# Patient Record
Sex: FEMALE
[De-identification: names, ages, dates, MRNs, and addresses within clinical notes are randomized per-mention and may not be internally consistent; named-entity substitution may affect disease eponyms.]

---

## 2020-07-17 ENCOUNTER — HOSPITAL ENCOUNTER (EMERGENCY)
Dept: HOSPITAL 56 - MW.ED | Age: 20
Discharge: HOME | End: 2020-07-17
Payer: COMMERCIAL

## 2020-07-17 DIAGNOSIS — Z79.899: ICD-10-CM

## 2020-07-17 DIAGNOSIS — Z88.8: ICD-10-CM

## 2020-07-17 DIAGNOSIS — R18.8: Primary | ICD-10-CM

## 2020-07-17 DIAGNOSIS — Z85.43: ICD-10-CM

## 2020-07-17 DIAGNOSIS — K76.9: ICD-10-CM

## 2020-07-17 DIAGNOSIS — J90: ICD-10-CM

## 2020-07-17 LAB
BUN SERPL-MCNC: 9 MG/DL (ref 7–18)
CHLORIDE SERPL-SCNC: 103 MMOL/L (ref 98–107)
CO2 SERPL-SCNC: 26.2 MMOL/L (ref 21–32)
GLUCOSE SERPL-MCNC: 92 MG/DL (ref 74–106)
POTASSIUM SERPL-SCNC: 3.6 MMOL/L (ref 3.5–5.1)
SODIUM SERPL-SCNC: 143 MMOL/L (ref 136–145)

## 2020-07-17 PROCEDURE — 81003 URINALYSIS AUTO W/O SCOPE: CPT

## 2020-07-17 PROCEDURE — 36415 COLL VENOUS BLD VENIPUNCTURE: CPT

## 2020-07-17 PROCEDURE — 80053 COMPREHEN METABOLIC PANEL: CPT

## 2020-07-17 PROCEDURE — 96361 HYDRATE IV INFUSION ADD-ON: CPT

## 2020-07-17 PROCEDURE — 96374 THER/PROPH/DIAG INJ IV PUSH: CPT

## 2020-07-17 PROCEDURE — 74177 CT ABD & PELVIS W/CONTRAST: CPT

## 2020-07-17 PROCEDURE — 83605 ASSAY OF LACTIC ACID: CPT

## 2020-07-17 PROCEDURE — 85025 COMPLETE CBC W/AUTO DIFF WBC: CPT

## 2020-07-17 PROCEDURE — 99284 EMERGENCY DEPT VISIT MOD MDM: CPT

## 2020-07-17 PROCEDURE — 85610 PROTHROMBIN TIME: CPT

## 2020-07-17 PROCEDURE — 87040 BLOOD CULTURE FOR BACTERIA: CPT

## 2020-07-17 NOTE — EDM.PDOC
ED [facility] GENERAL MEDICAL PROBLEM





- General


Chief Complaint: General


Stated Complaint: BLURRED VISION; DIZZINESS


Time Seen by Provider: 07/17/20 12:04





- History of Present Illness


INITIAL COMMENTS - FREE TEXT/NARRATIVE: 





HISTORY AND PHYSICAL:





History of present illness:


This 20-year-old female was recently diagnosed with ovarian cancer and had a 

large resection that was done on July 8 and the patient was discharged on 

Tuesday.  Today she began having some intermittent lightheadedness while 

standing up, visual changes, but had been wearing a scopolamine patch.  She also

has increased tenderness in her abdomen.  No nausea or vomiting.  No reported 

fevers.  No urinary symptoms.  Worse with walking.  Better with laying down.





Review of systems: 


A 10-point review of systems, other than pertinent positives and negatives as 

stated per HPI, is otherwise negative.





Past medical history: 


As per history of present illness and as reviewed below otherwise 

noncontributory.





Surgical history: 


As per history of present illness and as reviewed below otherwise 

noncontributory.





Social history: 


No reported history of drug or alcohol abuse.





Family history: 


As per history of present illness and as reviewed below otherwise noncon

tributory.





Physical exam:


VITAL SIGNS:  Reviewed.


GENERAL: Mild distress


HEAD: No signs of head trauma.


EYES: Pupils are equal.  Extraocular motions intact.


EARS: Hearing grossly intact.


MOUTH: Oropharynx is normal.


NECK: No adenopathy, no JVD.   


CHEST: Chest with clear breath sounds bilaterally.  No wheezes, rales, or 

rhonchi.


CARDIAC: Mild tachycardia.  Regular rhythm.  No discernible murmur.


VASCULAR: Peripheral pulses normal and equal in all extremities.


ABDOMEN: Soft, mild diffuse tenderness, surgical incision is clean dry and 

intact.


MUSCULOSKELETAL: Good range of motion of all major joints. Extremities without 

clubbing, cyanosis or edema.


NEUROLOGIC EXAM: Alert and oriented x 3.  No focal sensory or motor deficits.  

Speech normal.  Follows commands.


PSYCHIATRIC: Mood normal.


SKIN: No rash or lesions.








Initial Differential Diagnosis & Plan:


Intra-abdominal abscess, succus leakage, hematoma, seroma, bacteremia





Labs to include CBC, complete metabolic panel, lactate, blood cultures, treat 

her mild nausea with Ativan.  She is allergic to phenothiazines and reports that

Zofran usually does not work.








Definitive disposition and diagnosis as appropriate pending reevaluation and 

review of above.











- Related Data


                                    Allergies











Allergy/AdvReac Type Severity Reaction Status Date / Time


 


prochlorperazine Allergy  Other Verified 07/17/20 12:20





[From Compazine]     











Home Meds: 


                                    Home Meds





Enoxaparin [Lovenox] 40 mg SUBCUT DAILY 07/17/20 [History]


HYDROmorphone [Dilaudid] 2 mg PO ASDIRECTED 07/17/20 [History]











ED ROS GENERAL





- Review of Systems


Review Of Systems: See Below (Noted)





ED EXAM, GENERAL





- Physical Exam


Exam: See Below (Noted as above)





Course





- Vital Signs


Text/Narrative:: 





[facility], and she 

was reassured by the exam.  The patient has good follow-up and she does not feel

 that the patient needs to go for immediate follow-up to [place].  I feel that 

this is reasonable.  The CT does not show evidence of intra-abdominal abscess.  

The patient does have ascites which would be expected after this radical type 

surgery.  There is no other underlying pathology that was unexpected.  The 

surgery team will review this closely.  We will provide a CD of the CT scan for 

their review.  I will also fax the records to the surgical office including my 

note.





My diagnostic impression


1.  Dizziness likely secondary to scopolamine patch


2.  Ascites postoperative likely secondary to normal inflammatory reaction


3.  New liver lesion unclear if this represents infection versus malignancy.  

Given that the patient has no evidence of infectious findings including a normal

 white count, lack of fever, and no significant tachycardia I do not feel that 

this represents infection that requires antibiotics at this point.


4.  History of ovarian cancer with radical resection in the abdomen.





Discharge home follow-up with surgical team and oncology.


Last Recorded V/S: 


                                Last Vital Signs











Temp  97.8 F   07/17/20 12:17


 


Pulse  82   07/17/20 12:17


 


Resp  18   07/17/20 12:17


 


BP  111/78   07/17/20 12:17


 


Pulse Ox  97   07/17/20 12:17














- Orders/Labs/Meds


Orders: 


                               Active Orders 24 hr











 Category Date Time Status


 


 Cardiac Monitoring [RC] .AS DIRECTED Care  07/17/20 12:17 Active


 


 Pulse Oximetry [RC] ASDIRECTED Care  07/17/20 12:17 Active


 


 CULTURE BLOOD [BC] Stat Lab  07/17/20 12:49 Received


 


 CULTURE BLOOD [BC] Stat Lab  07/17/20 12:52 Received


 


 Sodium Chloride 0.9% [Saline Flush] Med  07/17/20 12:16 Active





 10 ml FLUSH ASDIRECTED PRN   


 


 Sodium Chloride 0.9% [Saline Flush] Med  07/17/20 12:16 Active





 2.5 ml FLUSH ASDIRECTED PRN   


 


 Blood Culture x2 Reflex Set [OM.PC] Stat Ot  07/17/20 12:17 Ordered


 


 Saline Lock Insert [OM.PC] Stat Ot  07/17/20 12:16 Ordered








                                Medication Orders





Sodium Chloride (Saline Flush)  10 ml FLUSH ASDIRECTED PRN


   PRN Reason: Keep Vein Open


   Last Admin: 07/17/20 12:35  Dose: 10 ml


   Documented by: JARRET


Sodium Chloride (Saline Flush)  2.5 ml FLUSH ASDIRECTED PRN


   PRN Reason: Keep Vein Open


   Last Admin: 07/17/20 12:35  Dose: 2.5 ml


   Documented by: JARRET








Labs: 


                                Laboratory Tests











  07/17/20 07/17/20 07/17/20 Range/Units





  12:18 12:36 12:36 


 


WBC    8.66  (4.0-11.0)  K/uL


 


RBC    4.06 L  (4.30-5.90)  M/uL


 


Hgb    12.3  (12.0-16.0)  g/dL


 


Hct    36.4  (36.0-46.0)  %


 


MCV    89.7  (80.0-98.0)  fL


 


MCH    30.3  (27.0-32.0)  pg


 


MCHC    33.8  (31.0-37.0)  g/dL


 


RDW Std Deviation    43.4  (28.0-62.0)  fl


 


RDW Coeff of Gio    14  (11.0-15.0)  %


 


Plt Count    375  (150-400)  K/uL


 


MPV    9.20  (7.40-12.00)  fL


 


Neut % (Auto)    66.3  (48.0-80.0)  %


 


Lymph % (Auto)    17.4  (16.0-40.0)  %


 


Mono % (Auto)    8.2  (0.0-15.0)  %


 


Eos % (Auto)    7.6 H  (0.0-7.0)  %


 


Baso % (Auto)    0.5  (0.0-1.5)  %


 


Neut # (Auto)    5.7  (1.4-5.7)  K/uL


 


Lymph # (Auto)    1.5  (0.6-2.4)  K/uL


 


Mono # (Auto)    0.7  (0.0-0.8)  K/uL


 


Eos # (Auto)    0.7  (0.0-0.7)  K/uL


 


Baso # (Auto)    0.0  (0.0-0.1)  K/uL


 


Nucleated RBC %    0.0  /100WBC


 


Nucleated RBCs #    0  K/uL


 


INR     


 


Lactate   0.9   (0.20-2.00)  mmol/L


 


Sodium     (136-145)  mmol/L


 


Potassium     (3.5-5.1)  mmol/L


 


Chloride     ()  mmol/L


 


Carbon Dioxide     (21.0-32.0)  mmol/L


 


BUN     (7.0-18.0)  mg/dL


 


Creatinine     (0.6-1.0)  mg/dL


 


Est Cr Clr Drug Dosing     mL/min


 


Estimated GFR (MDRD)     ml/min


 


Glucose     ()  mg/dL


 


Calcium     (8.5-10.1)  mg/dL


 


Total Bilirubin     (0.2-1.0)  mg/dL


 


AST     (15-37)  IU/L


 


ALT     (14-63)  IU/L


 


Alkaline Phosphatase     ()  U/L


 


Total Protein     (6.4-8.2)  g/dL


 


Albumin     (3.4-5.0)  g/dL


 


Globulin     (2.6-4.0)  g/dL


 


Albumin/Globulin Ratio     (0.9-1.6)  


 


Urine Color  YELLOW    


 


Urine Appearance  CLEAR    


 


Urine pH  6.0    (5.0-8.0)  


 


Ur Specific Gravity  1.020    (1.001-1.035)  


 


Urine Protein  NEGATIVE    (NEGATIVE)  mg/dL


 


Urine Glucose (UA)  NEGATIVE    (NEGATIVE)  mg/dL


 


Urine Ketones  NEGATIVE    (NEGATIVE)  mg/dL


 


Urine Occult Blood  NEGATIVE    (NEGATIVE)  


 


Urine Nitrite  NEGATIVE    (NEGATIVE)  


 


Urine Bilirubin  NEGATIVE    (NEGATIVE)  


 


Urine Urobilinogen  0.2    (<2.0)  EU/dL


 


Ur Leukocyte Esterase  NEGATIVE    (NEGATIVE)  














  07/17/20 07/17/20 Range/Units





  12:36 12:36 


 


WBC    (4.0-11.0)  K/uL


 


RBC    (4.30-5.90)  M/uL


 


Hgb    (12.0-16.0)  g/dL


 


Hct    (36.0-46.0)  %


 


MCV    (80.0-98.0)  fL


 


MCH    (27.0-32.0)  pg


 


MCHC    (31.0-37.0)  g/dL


 


RDW Std Deviation    (28.0-62.0)  fl


 


RDW Coeff of Gio    (11.0-15.0)  %


 


Plt Count    (150-400)  K/uL


 


MPV    (7.40-12.00)  fL


 


Neut % (Auto)    (48.0-80.0)  %


 


Lymph % (Auto)    (16.0-40.0)  %


 


Mono % (Auto)    (0.0-15.0)  %


 


Eos % (Auto)    (0.0-7.0)  %


 


Baso % (Auto)    (0.0-1.5)  %


 


Neut # (Auto)    (1.4-5.7)  K/uL


 


Lymph # (Auto)    (0.6-2.4)  K/uL


 


Mono # (Auto)    (0.0-0.8)  K/uL


 


Eos # (Auto)    (0.0-0.7)  K/uL


 


Baso # (Auto)    (0.0-0.1)  K/uL


 


Nucleated RBC %    /100WBC


 


Nucleated RBCs #    K/uL


 


INR  1.00   


 


Lactate    (0.20-2.00)  mmol/L


 


Sodium   143  (136-145)  mmol/L


 


Potassium   3.6  (3.5-5.1)  mmol/L


 


Chloride   103  ()  mmol/L


 


Carbon Dioxide   26.2  (21.0-32.0)  mmol/L


 


BUN   9  (7.0-18.0)  mg/dL


 


Creatinine   0.8  (0.6-1.0)  mg/dL


 


Est Cr Clr Drug Dosing   107.26  mL/min


 


Estimated GFR (MDRD)   > 60.0  ml/min


 


Glucose   92  ()  mg/dL


 


Calcium   9.3  (8.5-10.1)  mg/dL


 


Total Bilirubin   0.6  (0.2-1.0)  mg/dL


 


AST   33  (15-37)  IU/L


 


ALT   64 H  (14-63)  IU/L


 


Alkaline Phosphatase   58  ()  U/L


 


Total Protein   7.6  (6.4-8.2)  g/dL


 


Albumin   3.9  (3.4-5.0)  g/dL


 


Globulin   3.7  (2.6-4.0)  g/dL


 


Albumin/Globulin Ratio   1.1  (0.9-1.6)  


 


Urine Color    


 


Urine Appearance    


 


Urine pH    (5.0-8.0)  


 


Ur Specific Gravity    (1.001-1.035)  


 


Urine Protein    (NEGATIVE)  mg/dL


 


Urine Glucose (UA)    (NEGATIVE)  mg/dL


 


Urine Ketones    (NEGATIVE)  mg/dL


 


Urine Occult Blood    (NEGATIVE)  


 


Urine Nitrite    (NEGATIVE)  


 


Urine Bilirubin    (NEGATIVE)  


 


Urine Urobilinogen    (<2.0)  EU/dL


 


Ur Leukocyte Esterase    (NEGATIVE)  











Meds: 


Medications











Generic Name Dose Route Start Last Admin





  Trade Name Freq  PRN Reason Stop Dose Admin


 


Sodium Chloride  10 ml  07/17/20 12:16  07/17/20 12:35





  Saline Flush  FLUSH   10 ml





  ASDIRECTED PRN   Administration





  Keep Vein Open  


 


Sodium Chloride  2.5 ml  07/17/20 12:16  07/17/20 12:35





  Saline Flush  FLUSH   2.5 ml





  ASDIRECTED PRN   Administration





  Keep Vein Open  














Discontinued Medications














Generic Name Dose Route Start Last Admin





  Trade Name Freq  PRN Reason Stop Dose Admin


 


Lactated Ringer's  1,000 mls @ 999 mls/hr  07/17/20 12:16  07/17/20 12:34





  Ringers, Lactated  IV  07/17/20 13:16  999 mls/hr





  .BOLUS ONE   Administration


 


Lorazepam  1 mg  07/17/20 12:16  07/17/20 12:35





  Ativan  IVPUSH  07/17/20 12:17  1 mg





  ONETIME ONE   Administration














Departure





- Departure


Time of Disposition: 15:06


Disposition: Home, Self-Care 01


Condition: Good


Clinical Impression: 


 Ascites, Pleural effusion, right, History of ovarian cancer, Liver lesion








- Discharge Information


*PRESCRIPTION DRUG MONITORING PROGRAM REVIEWED*: Not Applicable


*COPY OF PRESCRIPTION DRUG MONITORING REPORT IN PATIENT MADAY: Not Applicable


Instructions:  Pleural Effusion, Ascites, Ovarian Tumors


Referrals: 


PCP,Not In Area [Primary Care Provider] - 


Forms:  ED Department Discharge


Additional Instructions: 


The following information is given to patients seen in the emergency department 

who are being discharged to home. This information is to outline your options 

for follow-up care. We provide all patients seen in our emergency department 

with a follow-up referral.





The need for follow-up, as well as the timing and circumstances, are variable 

depending upon the specifics of your emergency department visit.





If you don't have a primary care physician on staff, we will provide you with a 

referral. We always advise you to contact your personal physician following an 

emergency department visit to inform them of the circumstance of the visit and 

for follow-up with them and/or the need for any referrals to a consulting 

specialist.





The emergency department will also refer you to a specialist when appropriate. 

This referral assures that you have the opportunity for follow-up care with a 

specialist. All of these measure are taken in an effort to provide you with 

optimal care, which includes your follow-up.





Thank you for coming to the [facility] urgency department for 

your care today.  It was Dr. Willis's pleasure to take care of you.





Please follow-up with your surgeon as arranged and your oncology team.  Please 

return the emergency department for fever, worsening, or any other concerns.  

Please make sure to address your new findings with your oncology and surgical 

team.





Under all circumstances we always encourage you to contact your private 

physician who remains a resource for coordinating your care. When calling for 

follow-up care, please make the office aware that this follow-up is from your 

recent emergency room visit. If for any reason you are refused follow-up, please

contact the [facility] Emergency Department

at [phone] and asked to speak to the emergency department charge nurse.








Sepsis Event Note (ED)





- Focused Exam


Vital Signs: 


                                   Vital Signs











  Temp Pulse Resp BP Pulse Ox


 


 07/17/20 12:17  97.8 F  82  18  111/78  97














- My Orders


Last 24 Hours: 


My Active Orders





07/17/20 12:16


Sodium Chloride 0.9% [Saline Flush]   10 ml FLUSH ASDIRECTED PRN 


Sodium Chloride 0.9% [Saline Flush]   2.5 ml FLUSH ASDIRECTED PRN 


Saline Lock Insert [OM.PC] Stat 





07/17/20 12:17


Cardiac Monitoring [RC] .AS DIRECTED 


Pulse Oximetry [RC] ASDIRECTED 


Blood Culture x2 Reflex Set [OM.PC] Stat 





07/17/20 12:49


CULTURE BLOOD [BC] Stat 





07/17/20 12:52


CULTURE BLOOD [BC] Stat 














- Assessment/Plan


Last 24 Hours: 


My Active Orders





07/17/20 12:16


Sodium Chloride 0.9% [Saline Flush]   10 ml FLUSH ASDIRECTED PRN 


Sodium Chloride 0.9% [Saline Flush]   2.5 ml FLUSH ASDIRECTED PRN 


Saline Lock Insert [OM.PC] Stat 





07/17/20 12:17


Cardiac Monitoring [RC] .AS DIRECTED 


Pulse Oximetry [RC] ASDIRECTED 


Blood Culture x2 Reflex Set [OM.PC] Stat 





07/17/20 12:49


CULTURE BLOOD [BC] Stat 





07/17/20 12:52


CULTURE BLOOD [BC] Stat

## 2020-07-17 NOTE — CT
CT abdomen and pelvis

 

Technique: Multiple axial sections were obtained from above the dome 

of the diaphragm inferiorly through the pubic symphysis.  Intravenous 

contrast was utilized.  No oral contrast has been given.

 

Comparison: Previous CT abdomen and pelvis study of 06/29/20.

 

Findings: Small right-sided pleural effusion is seen.  Minimal 

bibasilar atelectasis is noted.

 

Small amount of fluid is seen around the liver and spleen with more 

confluent ascites within the pelvis.

 

Low density lesion noted within the dome of the right lobe of the 

liver.  This is not appreciated even in retrospect on prior exam.  

This abnormality measures about 3.7 cm.  Since this appears as an 

interval change from previous exam, difficult to exclude liver 

metastasis or liver abscess.  No additional liver abnormality is 

appreciated.  Spleen appears normal.  Adrenal glands show no nodule.  

Pancreas is within normal limits.  Gallbladder shows no calcified 

gallstones.  Adrenal glands show no nodule.  Kidneys show symmetric 

contrast enhancement without hydronephrosis or mass.  Aorta shows no 

aneurysm.  No retroperitoneal adenopathy or mesenteric abnormalities 

are seen.  Previous right ovarian mass is no longer seen presumably 

from previous resection.

 

Bone window settings were reviewed which shows no acute osseous 

finding.

 

Impression:

1.  Small right-sided pleural effusion with mild bibasilar 

atelectasis.

2.  3.7 cm low density lesion within the dome of the right lobe of the

 liver not seen on previous CT exams.  Difficult to exclude liver 

metastasis or liver abscess.

3.  Mild amount of fluid around the liver and spleen with more 

confluent ascites within the pelvis.

4.  No additional abnormality is appreciated.

 

Diagnostic code #9

 

This report was dictated in MDT

## 2020-07-23 ENCOUNTER — HOSPITAL ENCOUNTER (OUTPATIENT)
Dept: HOSPITAL 56 - MW.SDS | Age: 20
Discharge: HOME | End: 2020-07-23
Attending: SURGERY
Payer: COMMERCIAL

## 2020-07-23 DIAGNOSIS — C56.9: Primary | ICD-10-CM

## 2020-07-23 DIAGNOSIS — Z88.8: ICD-10-CM

## 2020-07-23 PROCEDURE — 81025 URINE PREGNANCY TEST: CPT

## 2020-07-23 PROCEDURE — 36561 INSERT TUNNELED CV CATH: CPT

## 2020-07-23 PROCEDURE — 71045 X-RAY EXAM CHEST 1 VIEW: CPT

## 2020-07-23 PROCEDURE — C1788 PORT, INDWELLING, IMP: HCPCS

## 2020-07-23 PROCEDURE — 76000 FLUOROSCOPY <1 HR PHYS/QHP: CPT

## 2020-07-23 NOTE — PCM.POSTAN
POST ANESTHESIA ASSESSMENT





- MENTAL STATUS


Mental Status: Alert, Oriented





- VITAL SIGNS


Vital Signs: 


                                Last Vital Signs











Temp  36.2 C   07/23/20 11:38


 


Pulse  87   07/23/20 11:59


 


Resp  14   07/23/20 11:59


 


BP  109/85   07/23/20 11:59


 


Pulse Ox  99   07/23/20 11:59














- RESPIRATORY


Respiratory Status: Respiratory Rate WNL, Airway Patent, O2 Saturation Stable





- CARDIOVASCULAR


CV Status: Pulse Rate WNL, Blood Pressure Stable





- GASTROINTESTINAL


GI Status: No Symptoms





- PAIN


Pain Score: 0





- POST OP HYDRATION


Hydration Status: Adequate & Stable





- OBSERVATIONS


Free Text/Narrative:: 





No anesthesia problems

## 2020-07-23 NOTE — PCM.OPNOTE
- General Post-Op/Procedure Note


Date of Surgery/Procedure: 07/23/20


Operative Procedure(s): Right internal jugular port a cath


Findings: 





Right IJ port 


Pre Op Diagnosis: Ovarian cancer


Post-Op Diagnosis: Ovarian cancer


Anesthesia Technique: General LMA


Primary Surgeon: Cris Rojo


Fluid Replacement, Intraop: 1,300


EBL in mLs: 10


Condition: Good

## 2020-07-23 NOTE — OR
SURGEON:

CRIS ROJO MD

 

DATE OF PROCEDURE:  07/23/2020

 

PREOPERATIVE DIAGNOSIS:

Ovarian cancer.

 

POSTOPERATIVE DIAGNOSIS:

Ovarian cancer.

 

PROCEDURE PERFORMED:

Right internal jugular Port-A-Cath placement.

 

PRIMARY SURGEON:

Cris Rojo MD

 

ANESTHESIA:

General LMA.

 

FLUIDS:

1300 mL of crystalloid.

 

ESTIMATED BLOOD LOSS:

10 mL.

 

FINDINGS:

Right internal jugular Port-A-Cath placement.

 

COMPLICATIONS:

None.

 

INDICATIONS:

The patient is a 20-year-old female who was recently diagnosed with stage III

ovarian cancer.  The patient will be undergoing chemotherapy and is in need of a

centralized access.  The patient and I discussed the Port-A-Cath procedure,

expected perioperative course, and the risks including bleeding, infection, or

damage to surrounding structures including pneumothorax, hemothorax, and/or

cardiac tamponade.  The patient verbalized understanding and wishes to proceed.

 

PROCEDURE IN DETAIL:

The patient was met in the OR and placed on the OR table in supine position.  A

time-out was completed verifying the patient's name, age, date of birth,

allergies, and procedure to be performed.  General LMA anesthesia was induced.

I identified the vascular anatomy of the right side of the neck.  I identified

the right internal jugular vein and the associated right carotid artery.  The

arms were tucked to the patient's side and a shoulder roll placed under the

shoulders.  The neck and chest were prepped and draped in usual standard

fashion.  I reidentified the vascular anatomy of the right side of the neck

using a sterile ultrasound probe with the patient placed into Trendelenburg

position.  I identified the right internal jugular vein.  I anesthetized the

skin overlying the right internal jugular vein with a 1:1 mixture of 0.5%

Marcaine plain and 1% lidocaine plain.  I also anesthetized my catheter tubing

tract and my port site on the chest wall with the same mixture.  Using

ultrasound guidance, I placed a guide needle into the right internal jugular

vein.  I obtained an immediate return of venous blood.  A guidewire was placed

down the needle into the chest.  Fluoroscopy showed that the catheter tubing had

went from the right internal jugular vein into the subclavian vein.  This was

pulled back and guided into the superior vena cava.  The guidewire was then

secured to the drapes, and I turned my attention to the right upper chest wall.

Three fingerbreadths below the right lateral clavicle, I made a 4 cm incision

with a 15 blade.  Cautery was used to dissect down to the level of subcutaneous

fat and a subcutaneous chest wall pocket was then created.  A tunneling device

was used to tunnel the catheter tubing from the port site up over the clavicle

to the guidewire site on the neck.  Vascular dilator and sheath were then placed

over the guidewire.  Using fluoroscopic guidance, I dilated up the vascular

tract.  The dilator and guidewire were removed, and the catheter tubing was

placed down the sheath, into the superior vena cava.  The sheath was then peeled

away.  Using fluoroscopy, I then pulled the catheter tubing back to the level of

the atriocaval junction.  I aspirated the end of my catheter and obtained a good

return of venous blood.  X-ray was taken of the catheter tubing in the chest as

well as up in the neck.  These were saved into the patient's chart.  The

catheter tubing was then trimmed to fit and placed on the Port-A-Cath device.

The port was placed into the chest wall and secured on either side with 2-0

Prolene sutures.  It was then aspirated and had a good return of venous blood.

It was locked with 4 mL of heparinized saline.  The chest wall site was then

closed with interrupted 3-0 Vicryl in the subcutaneous fat space and skin was

closed with a running 4-0 Monocryl stitch.  The neck insertion site was closed

with interrupted 4-0 Monocryl suture.  Dermabond and sterile dressings were

applied.  The patient tolerated the procedure well.  All counts were complete

and correct at the end of the case.  She was extubated and taken to PACU in

stable condition.  A portable chest x-ray will be taken in the PACU.

 

 

ANDRIA AVILA

DD:  07/23/2020 11:41:03

DT:  07/23/2020 16:41:47

Job #:  [id number]

## 2020-07-23 NOTE — CR
Chest: Portable view of the chest was obtained.

 

Comparison: No prior chest x-ray, prior chest CT of 07/01/20.

 

Heart size and mediastinum are normal.  Right-sided infusion port is 

seen.  Lungs are clear with no acute parenchymal change.  Bony 

structures are grossly intact.

 

Impression:

1.  Nothing acute is seen on portable chest x-ray.

 

Diagnostic code #2

 

This report was dictated in MDT

## 2020-07-23 NOTE — PCM.PREANE
Preanesthetic Assessment





- Anesthesia/Transfusion/Family Hx


Anesthesia History: Prior Anesthesia Without Reaction


Family History of Anesthesia Reaction: No


Transfusion History: No Prior Transfusion(s)


Intubation History: Unknown





- Review of Systems


General: No Symptoms


Pulmonary: No Symptoms


Cardiovascular: No Symptoms


Gastrointestinal: No Symptoms


Neurological: No Symptoms


Other: Reports: None





- Physical Assessment


Height: 5 ft 7 in


Weight: 62.596 kg


ASA Class: 2


Mental Status: Alert & Oriented x3


Airway Class: Mallampati = 1


Dentition: Reports: Normal Dentition


Thyro-Mental Finger Breadths: 3


Mouth Opening Finger Breadths: 3


ROM/Head Extension: Full


Lungs: Clear to Auscultation, Normal Respiratory Effort


Cardiovascular: Regular Rate, Regular Rhythm





- Allergies


Allergies/Adverse Reactions: 


                                    Allergies











Allergy/AdvReac Type Severity Reaction Status Date / Time


 


prochlorperazine Allergy  Other Verified 07/20/20 13:16





[From Compazine]     














- Blood


Blood Available: No





- Anesthesia Plan


Pre-Op Medication Ordered: None





- Acknowledgements


Anesthesia Type Planned: General Anesthesia


Pt an Appropriate Candidate for the Planned Anesthesia: Yes


Alternatives and Risks of Anesthesia Discussed w Pt/Guardian: Yes


Pt/Guardian Understands and Agrees with Anesthesia Plan: Yes





PreAnesthesia Questionnaire


HEENT History: Reports: None


Cardiovascular History: Reports: None


Respiratory History: Reports: None


Gastrointestinal History: Reports: None


Genitourinary History: Reports: None


OB/GYN History: Reports: Other (See Below)


Other OB/BYN History: recent diagnosis of ovarian cancer


Musculoskeletal History: Reports: None


Neurological History: Reports: None


Psychiatric History: Reports: None


Endocrine/Metabolic History: Reports: None


Hematologic History: Reports: Anticoagulation Therapy


Immunologic History: Reports: None


Oncologic (Cancer) History: Reports: Ovarian


Other Oncologic History: Dystoma.... Cancer.


Dermatologic History: Reports: None





- Past Surgical History


Head Surgeries/Procedures: Reports: None


HEENT Surgical History: Reports: Tonsillectomy


Cardiovascular Surgical History: Reports: None


Respiratory Surgical History: Reports: None


GI Surgical History: Reports: None


Female  Surgical History: Reports: Other (See Below)


Other Female  Surgeries/Procedures: exploratory laparotomy with gildardo 

salpingectomy & "1 1/2 ovary removed" in [place] on 7/7/20


Endocrine Surgical History: Reports: None


Neurological Surgical History: Reports: None


Musculoskeletal Surgical History: Reports: None


Oncologic Surgical History: Reports: Other (See Below)


Other Oncologic Surgeries/Procedures: exploratory laparotomy 7/7/20


Dermatological Surgical History: Reports: None





- SUBSTANCE USE


Smoking Status *Q: Never Smoker





- HOME MEDS


Home Medications: 


                                    Home Meds





Enoxaparin [Lovenox] 40 mg SUBCUT DAILY 07/17/20 [History]


HYDROmorphone [Dilaudid] 2 mg PO ASDIRECTED PRN 07/17/20 [History]


Acetaminophen [Tylenol Extra Strength] 2 tab PO ASDIRECTED PRN 07/20/20 

[History]


Ibuprofen 1 tab PO ASDIRECTED PRN 07/20/20 [History]











- CURRENT (IN HOUSE) MEDS


Current Meds: 





                               Current Medications





Lactated Ringer's (Ringers, Lactated)  1,000 mls @ 125 mls/hr IV ASDIRECTED IBAN


Sodium Chloride (Saline Flush)  10 ml FLUSH ASDIRECTED PRN


   PRN Reason: Keep Vein Open


Sodium Chloride (Saline Flush)  2.5 ml FLUSH ASDIRECTED PRN


   PRN Reason: Keep Vein Open


Sodium Chloride (Normal Saline)  10 ml IV ASDIRECTED PRN


   PRN Reason: IV Use





Discontinued Medications





Bupivacaine HCl (Sensorcaine-Mpf 0.5%) Confirm Administered Dose 10 ml .ROUTE 

.STK-MED ONE


   Stop: 07/23/20 07:29


Fentanyl (Sublimaze) Confirm Administered Dose 100 mcg .ROUTE .STK-MED ONE


   Stop: 07/23/20 07:01


Glycopyrrolate (Robinul) Confirm Administered Dose 0.2 mg .ROUTE .STK-MED ONE


   Stop: 07/23/20 07:03


Heparin Sodium (Porcine) (Heparin Lock Flush 100 Units/Ml) Confirm Administered 

Dose 300 unit .ROUTE .STK-MED ONE


   Stop: 07/23/20 07:30


Cefazolin Sodium/Dextrose 2 gm (/ Premix)  50 mls @ 100 mls/hr IV ONETIME ONE


   Stop: 07/22/20 10:04


Iopamidol (Isovue-M 200) Confirm Administered Dose 10 ml ITHECAL .STK-MED ONE


   Stop: 07/23/20 07:30


Ketorolac Tromethamine (Toradol) Confirm Administered Dose 30 mg .ROUTE .STK-MED

 ONE


   Stop: 07/23/20 07:03


Lidocaine (Xylocaine-Mpf 2%) Confirm Administered Dose 5 ml .ROUTE .STK-MED ONE


   Stop: 07/23/20 07:03


Lidocaine HCl (Xylocaine-Mpf 1%) Confirm Administered Dose 10 ml .ROUTE .STK-MED

 ONE


   Stop: 07/23/20 07:30


Midazolam HCl (Versed 1 Mg/Ml) Confirm Administered Dose 2 mg .ROUTE .STK-MED 

ONE


   Stop: 07/23/20 07:02


Octyl Cyanoacrylate (Dermabond Advance) Confirm Administered Dose 1 applic 

.ROUTE .[email or web address]-MED ONE


   Stop: 07/23/20 07:30


Ondansetron HCl (Zofran) Confirm Administered Dose 4 mg .ROUTE .STK-MED ONE


   Stop: 07/23/20 07:03


Propofol (Diprivan  20 Ml) Confirm Administered Dose 200 mg .ROUTE .[email or web address]-MED ONE


   Stop: 07/23/20 07:01

## 2020-07-23 NOTE — [id number]
Post Anesthesia Note





- EVALUATION WITHIN 48HRS OF ANESTHETIC


Vital Signs in Normal Range: Yes


Patient Participated in Evaluation: Yes


Respiratory Function Stable: Yes


Airway Patent: Yes


Cardiovascular Function Stable: Yes


Hydration Status Stable: Yes


Pain Control Satisfactory: Yes


Nausea and Vomiting Control Satisfactory: Yes


Mental Status Recovered: Yes


Vital Signs: 


                                Last Vital Signs











Temp  36.2 C   07/23/20 11:38


 


Pulse  87   07/23/20 11:59


 


Resp  14   07/23/20 11:59


 


BP  109/85   07/23/20 11:59


 


Pulse Ox  99   07/23/20 11:59














- COMMENTS/OBSERVATIONS


Free Text/Narrative:: 





No anesthesia problems

## 2020-07-24 NOTE — CR
Chest: 2 fluoroscopic spot views were obtained of the chest utilizing 

C-arm device.

 

Study shows placement of a Port-A-Cath.  Tip of the catheter lies near

 the right atrial and superior vena cava junction.  Fluoroscopy time 

given as 99.9 seconds

 

Impression:

1.  Procedural study as described above.

 

Diagnostic code #2

This report was dictated in MDT

## 2020-10-24 ENCOUNTER — HOSPITAL ENCOUNTER (EMERGENCY)
Dept: HOSPITAL 56 - MW.ED | Age: 20
Discharge: HOME | End: 2020-10-24
Payer: COMMERCIAL

## 2020-10-24 DIAGNOSIS — Z79.01: ICD-10-CM

## 2020-10-24 DIAGNOSIS — K20.90: ICD-10-CM

## 2020-10-24 DIAGNOSIS — Z88.8: ICD-10-CM

## 2020-10-24 DIAGNOSIS — K29.70: Primary | ICD-10-CM

## 2020-10-24 LAB
BUN SERPL-MCNC: 13 MG/DL (ref 7–18)
CHLORIDE SERPL-SCNC: 99 MMOL/L (ref 98–107)
CO2 SERPL-SCNC: 28 MMOL/L (ref 21–32)
GLUCOSE SERPL-MCNC: 87 MG/DL (ref 74–106)
POTASSIUM SERPL-SCNC: 3.4 MMOL/L (ref 3.5–5.1)
SODIUM SERPL-SCNC: 135 MMOL/L (ref 136–145)

## 2020-10-24 PROCEDURE — 99285 EMERGENCY DEPT VISIT HI MDM: CPT

## 2020-10-24 PROCEDURE — 85025 COMPLETE CBC W/AUTO DIFF WBC: CPT

## 2020-10-24 PROCEDURE — 84484 ASSAY OF TROPONIN QUANT: CPT

## 2020-10-24 PROCEDURE — 96374 THER/PROPH/DIAG INJ IV PUSH: CPT

## 2020-10-24 PROCEDURE — 36415 COLL VENOUS BLD VENIPUNCTURE: CPT

## 2020-10-24 PROCEDURE — 93005 ELECTROCARDIOGRAM TRACING: CPT

## 2020-10-24 PROCEDURE — 80053 COMPREHEN METABOLIC PANEL: CPT

## 2020-10-24 PROCEDURE — 74022 RADEX COMPL AQT ABD SERIES: CPT

## 2020-10-24 PROCEDURE — 96375 TX/PRO/DX INJ NEW DRUG ADDON: CPT

## 2020-10-24 NOTE — CR
INDICATION:



Epigastric pain



TECHNIQUE:



X-ray abdomen, two views and a single view of the chest. 



COMPARISON:



Chest x-ray 10/22/2020 



FINDINGS:



The heart is normal in size. The lungs are clear. There is a stable right 

sided chest port. No free air is visualized below the diaphragms. There is 

a small amount of retained stool throughout the colon. No dilated loops of 

small bowel are seen to suggest obstruction. No air-fluid levels. The 

visualized bones are unremarkable. 



IMPRESSION:



1. Nonobstructed bowel-gas pattern.



2. Negative for acute cardiopulmonary process.



Dictated by Tammi Biggs MD @ Oct 24 2020  5:12PM



Signed by Dr. Tammi Biggs @ Oct 24 2020  5:16PM

## 2020-10-24 NOTE — EDM.PDOC
ED HPI GENERAL MEDICAL PROBLEM





- General


Chief Complaint: Abdominal Pain


Stated Complaint: ABDOMINAL PAIN


Time Seen by Provider: 10/24/20 15:10


Source of Information: Reports: Patient


History Limitations: Reports: No Limitations





- History of Present Illness


INITIAL COMMENTS - FREE TEXT/NARRATIVE: 





Presents with her mother reporting epigastric and chest pain.  This unfortunate 

18-year-old patient has stage III ovarian cancer.  Underwent salpingectomy, 

oophorectomy, partial omenectomy. In July 2020 she started 4 cycles of 

chemotherapy, last on Monday October 19th.  She is saw her oncology providers on

Wednesday, October 21 she was diagnosed with GERD after complaining of chest 

pain.  Blood cultures were obtained.  On Thursday, October 22nd had a negative 

COVID-19 test.  She reports that "saliva burns all the way down", sharp burning 

pain midline chest and epigastric area, mouth sores.  She has tried [place], 

[place].  She vomited once a couple days ago but none since and has no 

nausea.  He has been trying to keep hydrated, has eaten little food. She has 

Ativan and hydrocodone at home.  Dr. Gillette is her supervising oncologist at the 

[facility] cancer treatment center here in [place] where she receives her 

chemotherapy.  Her mother Mayda has been on speaker phone throughout the 

entire interview.


  ** abdomen


Pain Score (Numeric/FACES): 8





- Related Data


                                    Allergies











Allergy/AdvReac Type Severity Reaction Status Date / Time


 


prochlorperazine Allergy  Other Verified 10/24/20 15:28





[From Compazine]     











Home Meds: 


                                    Home Meds





Enoxaparin [Lovenox] 40 mg SUBCUT DAILY 07/17/20 [History]


HYDROmorphone [Dilaudid] 2 mg PO ASDIRECTED PRN 07/17/20 [History]


Acetaminophen [Tylenol Extra Strength] 2 tab PO ASDIRECTED PRN 07/20/20 

[History]


Ibuprofen 1 tab PO ASDIRECTED PRN 07/20/20 [History]


HYDROmorphone [Dilaudid] 0.5 tab PO Q4H PRN 7 Days #20 tab 10/24/20 [Rx]











Past Medical History


HEENT History: Reports: None


Cardiovascular History: Reports: None


Respiratory History: Reports: None


Gastrointestinal History: Reports: None


Genitourinary History: Reports: None


OB/GYN History: Reports: Other (See Below)


Other OB/GYN History: recent diagnosis of ovarian cancer


Musculoskeletal History: Reports: None


Neurological History: Reports: None


Psychiatric History: Reports: None


Endocrine/Metabolic History: Reports: None


Hematologic History: Reports: Anticoagulation Therapy


Immunologic History: Reports: None


Oncologic (Cancer) History: Reports: Ovarian


Other Oncologic History: Dystoma.... Cancer.


Dermatologic History: Reports: None





- Past Surgical History


Head Surgeries/Procedures: Reports: None


HEENT Surgical History: Reports: Tonsillectomy


Cardiovascular Surgical History: Reports: None


Respiratory Surgical History: Reports: None


GI Surgical History: Reports: None


Female  Surgical History: Reports: Other (See Below)


Other Female  Surgeries/Procedures: exploratory laparotomy with gildardo 

salpingectomy & "1 1/2 ovary removed" in [place] on 7/7/20


Endocrine Surgical History: Reports: None


Neurological Surgical History: Reports: None


Musculoskeletal Surgical History: Reports: None


Oncologic Surgical History: Reports: Other (See Below)


Other Oncologic Surgeries/Procedures: exploratory laparotomy 7/7/20


Dermatological Surgical History: Reports: None





Social & Family History





- Family History


Family Medical History: Noncontributory





- Tobacco Use


Second Hand Smoke Exposure: No





- Recreational Drug Use


Recreational Drug Use: No





ED ROS GENERAL





- Review of Systems


Review Of Systems: Comprehensive ROS is negative, except as noted in HPI.


Constitutional: Reports: No Symptoms.  Denies: Fever, Chills


HEENT: Reports: No Symptoms


Respiratory: Reports: No Symptoms.  Denies: Shortness of Breath, Wheezing, Cough


Cardiovascular: Reports: Chest Pain


Endocrine: Reports: No Symptoms


GI/Abdominal: Reports: Abdominal Pain (epigastric), Constipation (BM over 3 days

ago)


: Reports: No Symptoms


Musculoskeletal: Reports: No Symptoms


Skin: Reports: No Symptoms


Neurological: Reports: No Symptoms


Psychiatric: Reports: No Symptoms, Anxiety


Hematologic/Lymphatic: Reports: No Symptoms


Immunologic: Reports: No Symptoms





ED EXAM, GI/ABD





- Physical Exam


Exam: See Below


Exam Limited By: No Limitations


General Appearance: Alert, No Apparent Distress


Ears: Normal External Exam, Normal Canal, Normal TMs


Nose: Normal Inspection


Throat/Mouth: Normal Inspection, Normal Teeth, Other (Mild irritation posterior 

pharynx, no oral lesions, tissue in good repair)


Neck: Normal Inspection, Lymphadenopathy (L) (Mild tenderness), Lymphadenopathy 

(R) (mild Tenderness)


Respiratory/Chest: No Respiratory Distress, Lungs Clear, Normal Breath Sounds


Cardiovascular: Normal Peripheral Pulses, Regular Rate, Rhythm, No Murmur


GI/Abdominal Exam: Normal Bowel Sounds, Soft, Other (mild Epigastric)


Back Exam: Normal Inspection


Extremities: Normal Inspection


Neurological: Alert, Oriented, Normal Cognition


Psychiatric: Normal Affect, Normal Mood


Skin Exam: Warm, Dry, Intact, No Rash, Other (Pale, chemotherapy-induced 

alopecia)


Lymphatic: No Adenopathy


  ** #1 Interpretation


EKG Date: 10/24/20


Rhythm: NSR


Axis: Normal


P-Wave: Present


QRS: Normal


ST-T: Normal


QT: Prolonged (494)


Comparison: NA - No Prior EKG





Course





- Vital Signs


Last Recorded V/S: 


                                Last Vital Signs











Temp  37.5 C   10/24/20 15:21


 


Pulse  85   10/24/20 18:40


 


Resp  16   10/24/20 18:40


 


BP  97/55 L  10/24/20 18:40


 


Pulse Ox  100   10/24/20 18:40














- Orders/Labs/Meds


Orders: 


                               Active Orders 24 hr











 Category Date Time Status


 


 EKG 12 Lead [EKG Documentation Completion] [RC] STAT Care  10/24/20 15:43 

Active











Labs: 


                                Laboratory Tests











  10/24/20 10/24/20 Range/Units





  16:13 16:13 


 


WBC  2.50 L   (4.0-11.0)  K/uL


 


RBC  2.39 L   (4.30-5.90)  M/uL


 


Hgb  7.2 L   (12.0-16.0)  g/dL


 


Hct  20.3 L   (36.0-46.0)  %


 


MCV  84.9   (80.0-98.0)  fL


 


MCH  30.1   (27.0-32.0)  pg


 


MCHC  35.5   (31.0-37.0)  g/dL


 


RDW Std Deviation  44.6   (28.0-62.0)  fl


 


RDW Coeff of Gio  14   (11.0-15.0)  %


 


Plt Count  78 L   (150-400)  K/uL


 


MPV  10.30   (7.40-12.00)  fL


 


Neut % (Auto)  59.6   (48.0-80.0)  %


 


Lymph % (Auto)  26.0   (16.0-40.0)  %


 


Mono % (Auto)  12.4   (0.0-15.0)  %


 


Eos % (Auto)  0.4   (0.0-7.0)  %


 


Baso % (Auto)  1.6 H   (0.0-1.5)  %


 


Neut # (Auto)  1.5   (1.4-5.7)  K/uL


 


Lymph # (Auto)  0.7   (0.6-2.4)  K/uL


 


Mono # (Auto)  0.3   (0.0-0.8)  K/uL


 


Eos # (Auto)  0.0   (0.0-0.7)  K/uL


 


Baso # (Auto)  0.0   (0.0-0.1)  K/uL


 


Nucleated RBC %  0.0   /100WBC


 


Nucleated RBCs #  0   K/uL


 


Sodium   135 L  (136-145)  mmol/L


 


Potassium   3.4 L  (3.5-5.1)  mmol/L


 


Chloride   99  ()  mmol/L


 


Carbon Dioxide   28.0  (21.0-32.0)  mmol/L


 


BUN   13  (7.0-18.0)  mg/dL


 


Creatinine   0.8  (0.6-1.0)  mg/dL


 


Est Cr Clr Drug Dosing   103.47  mL/min


 


Estimated GFR (MDRD)   > 60.0  ml/min


 


Glucose   87  ()  mg/dL


 


Calcium   9.1  (8.5-10.1)  mg/dL


 


Total Bilirubin   0.7  (0.2-1.0)  mg/dL


 


AST   13 L  (15-37)  IU/L


 


ALT   19  (14-63)  IU/L


 


Alkaline Phosphatase   57  ()  U/L


 


Troponin I   < 0.050  (0.000-0.056)  ng/mL


 


Total Protein   6.9  (6.4-8.2)  g/dL


 


Albumin   3.8  (3.4-5.0)  g/dL


 


Globulin   3.1  (2.6-4.0)  g/dL


 


Albumin/Globulin Ratio   1.2  (0.9-1.6)  











Meds: 


Medications














Discontinued Medications














Generic Name Dose Route Start Last Admin





  Trade Name Freq  PRN Reason Stop Dose Admin


 


Al Hydroxide/Mg Hydroxide 15  0 ml  10/24/20 15:44  10/24/20 16:16





ml/ Metoclopramide HCl 5 mg/  PO  10/24/20 15:45  1 each





Lidocaine HCl 5 ml  ONETIME ONE   Administration


 


Al Hydroxide/Mg Hydroxide 15  0 ml  10/24/20 18:22  10/24/20 19:05





  ml/ Lidocaine HCl 5 ml  PO  10/24/20 18:23  1 each





  ONETIME ONE   Administration


 


Hydromorphone HCl  1 mg  10/24/20 15:44  10/24/20 16:18





  Dilaudid  IVPUSH  10/24/20 15:45  1 mg





  ONETIME ONE   Administration


 


Sodium Chloride  1,000 mls @ 999 mls/hr  10/24/20 15:44  10/24/20 16:15





  Normal Saline  IV  10/24/20 16:44  999 mls/hr





  STAT ONE   Administration


 


Lidocaine/Prilocaine  1 gm  10/24/20 15:36  10/24/20 15:38





  [place]ml[place] Crm  TOP  10/24/20 15:37  1 gm





  ONETIME ONE   Administration


 


Lorazepam  1 mg  10/24/20 18:24  10/24/20 18:38





  Ativan  IVPUSH  10/24/20 18:25  1 mg





  ONETIME ONE   Administration


 


Ondansetron HCl  4 mg  10/24/20 16:49  10/24/20 17:04





  Zofran  IVPUSH  10/24/20 16:50  4 mg





  ONETIME ONE   Administration














- Re-Assessments/Exams


Free Text/Narrative Re-Assessment/Exam: 





10/24/20 1840 states that the GI cocktail helped and the Dilaudid helped.  She 

is concerned about home as just taking a swallow of a smoothie caused burning 

pain down her chest and into her epigastric area.  She states that at home a 

combination of Zofran and Ativan helps her nausea.  Since we just gave her an 

Zofran she would like some Ativan.  We will then give her another dose of 

cocktail and try oral fluids to be certain that she keeps them down.  She is 

quite anemic status post chemotherapy.  She states that she will follow up with 

oncology on Monday morning for possible transfusion.





Free Text/Narrative Re-Assessment/Exam: 





10/24/20 19:44


She states she is feeling much better now and is ready to go home.  She bhakti

erated the oral fluids after the GI cocktail and has not been nauseated since 

the Ativan.





Departure





- Departure


Time of Disposition: 19:45


Disposition: Home, Self-Care 01


Condition: Fair


Clinical Impression: 


 Esophagitis





Gastritis


Qualifiers:


 Gastritis type: unspecified gastritis Chronicity: unspecified 








- Discharge Information


Prescriptions: 


HYDROmorphone [Dilaudid] 0.5 tab PO Q4H PRN 7 Days #20 tab


 PRN Reason: Pain (Severe 7-10)


Referrals: 


PCP,None [Primary Care Provider] - 


Forms:  ED Department Discharge


Additional Instructions: 





The following information is given to patients seen in the emergency department 

who are being discharged to home. This information is to outline your options 

for follow-up care. We provide all patients seen in our emergency department 

with a follow-up referral.





The need for follow-up, as well as the timing and circumstances, are variable 

depending upon the specifics of your emergency department visit.





If you don't have a primary care physician on staff, we will provide you with a 

referral. We always advise you to contact your personal physician following an 

emergency department visit to inform them of the circumstance of the visit and 

for follow-up with them and/or the need for any referrals to a consulting spe

cialist.





The emergency department will also refer you to a specialist when appropriate. 

This referral assures that you have the opportunity for follow-up care with a 

specialist. All of these measure are taken in an effort to provide you with 

optimal care, which includes your follow-up.





Under all circumstances we always encourage you to contact your private 

physician who remains a resource for coordinating your care. When calling for 

follow-up care, please make the office aware that this follow-up is from your 

recent emergency room visit. If for any reason you are refused follow-up, please

contact the [facility] Emergency Department

at [phone] and asked to speak to the emergency department charge nurse.





1.  Please call oncology on Monday morning to discuss your anemia treatment.


2.  For esophageal/gastric pain: Premedicate with Ativan and Zofran.  Then, take

a dose of GI cocktail.


3.  Drink plenty of cool fluids including electrolyte solutions as your 

potassium was a little low.


4.  Return promptly for vomiting and not keeping down oral fluids, chest and 

abdominal pain not controlled by above measures





Sepsis Event Note (ED)





- Evaluation


Sepsis Screening Result: No Definite Risk





- Focused Exam


Vital Signs: 


                                   Vital Signs











  Temp Pulse Resp BP Pulse Ox


 


 10/24/20 18:40   85  16  97/55 L  100


 


 10/24/20 16:05   95  17  104/74  99


 


 10/24/20 15:21  37.5 C  106 H  16  129/59 L  100














- My Orders


Last 24 Hours: 


My Active Orders





10/24/20 15:43


EKG 12 Lead [EKG Documentation Completion] [RC] STAT 














- Assessment/Plan


Last 24 Hours: 


My Active Orders





10/24/20 15:43


EKG 12 Lead [EKG Documentation Completion] [RC] STAT

## 2020-10-26 NOTE — PCM.SN.2
** #1 Interpretation


EKG Date: 10/24/20


Time: 15:53


Rhythm: Other (Sinus tachycardia)


Rate (Beats/Min): 104


Axis: Normal


P-Wave: Present


QRS: Normal


ST-T: Other (T-wave inversions in III and V3)


QT: Normal (474 msec by manual calculation)


EKG Interpretation Comments: 


No acute ischemia.

## 2022-07-10 ENCOUNTER — HOSPITAL ENCOUNTER (EMERGENCY)
Dept: HOSPITAL 56 - MW.ED | Age: 22
Discharge: HOME | End: 2022-07-10
Payer: COMMERCIAL

## 2022-07-10 DIAGNOSIS — R07.9: ICD-10-CM

## 2022-07-10 DIAGNOSIS — Z88.8: ICD-10-CM

## 2022-07-10 DIAGNOSIS — Z3A.14: ICD-10-CM

## 2022-07-10 DIAGNOSIS — O99.891: Primary | ICD-10-CM

## 2022-07-10 LAB
BUN SERPL-MCNC: 14 MG/DL (ref 7–18)
CHLORIDE SERPL-SCNC: 102 MMOL/L (ref 98–107)
CO2 SERPL-SCNC: 22 MMOL/L (ref 21–32)
EGFRCR SERPLBLD CKD-EPI 2021: 128 ML/MIN (ref 60–?)
GLUCOSE SERPL-MCNC: 94 MG/DL (ref 74–106)
POTASSIUM SERPL-SCNC: 3.8 MMOL/L (ref 3.5–5.1)
SODIUM SERPL-SCNC: 134 MMOL/L (ref 136–145)

## 2022-07-10 PROCEDURE — 80053 COMPREHEN METABOLIC PANEL: CPT

## 2022-07-10 PROCEDURE — 71045 X-RAY EXAM CHEST 1 VIEW: CPT

## 2022-07-10 PROCEDURE — 36415 COLL VENOUS BLD VENIPUNCTURE: CPT

## 2022-07-10 PROCEDURE — 99285 EMERGENCY DEPT VISIT HI MDM: CPT

## 2022-07-10 PROCEDURE — 93970 EXTREMITY STUDY: CPT

## 2022-07-10 PROCEDURE — 93005 ELECTROCARDIOGRAM TRACING: CPT

## 2022-07-10 PROCEDURE — 85025 COMPLETE CBC W/AUTO DIFF WBC: CPT

## 2022-07-10 PROCEDURE — 71275 CT ANGIOGRAPHY CHEST: CPT

## 2023-01-08 ENCOUNTER — HOSPITAL ENCOUNTER (INPATIENT)
Dept: HOSPITAL 56 - MW.OBCHECK | Age: 23
LOS: 2 days | Discharge: HOME | End: 2023-01-10
Attending: OBSTETRICS & GYNECOLOGY | Admitting: OBSTETRICS & GYNECOLOGY
Payer: MEDICAID

## 2023-01-08 DIAGNOSIS — Z87.440: ICD-10-CM

## 2023-01-08 DIAGNOSIS — Z88.8: ICD-10-CM

## 2023-01-08 DIAGNOSIS — Z3A.40: ICD-10-CM

## 2023-01-08 DIAGNOSIS — Z20.822: ICD-10-CM

## 2023-01-08 DIAGNOSIS — Z98.890: ICD-10-CM

## 2023-01-08 DIAGNOSIS — O48.0: Primary | ICD-10-CM

## 2023-01-08 DIAGNOSIS — Z90.89: ICD-10-CM

## 2023-01-08 DIAGNOSIS — Z85.43: ICD-10-CM

## 2023-01-08 PROCEDURE — U0002 COVID-19 LAB TEST NON-CDC: HCPCS

## 2023-01-09 LAB
BUN SERPL-MCNC: 11 MG/DL (ref 7–18)
CHLORIDE SERPL-SCNC: 106 MMOL/L (ref 98–107)
CO2 SERPL-SCNC: 22.8 MMOL/L (ref 21–32)
EGFRCR SERPLBLD CKD-EPI 2021: 81 ML/MIN (ref 60–?)
GLUCOSE SERPL-MCNC: 95 MG/DL (ref 74–106)
POTASSIUM SERPL-SCNC: 4 MMOL/L (ref 3.5–5.1)
SODIUM SERPL-SCNC: 136 MMOL/L (ref 136–145)

## 2023-01-09 PROCEDURE — 3E0P7VZ INTRODUCTION OF HORMONE INTO FEMALE REPRODUCTIVE, VIA NATURAL OR ARTIFICIAL OPENING: ICD-10-PCS

## 2023-01-09 PROCEDURE — 3E0R3BZ INTRODUCTION OF ANESTHETIC AGENT INTO SPINAL CANAL, PERCUTANEOUS APPROACH: ICD-10-PCS

## 2023-01-09 PROCEDURE — 0HQ9XZZ REPAIR PERINEUM SKIN, EXTERNAL APPROACH: ICD-10-PCS | Performed by: OBSTETRICS & GYNECOLOGY
